# Patient Record
Sex: FEMALE | Race: WHITE | Employment: UNEMPLOYED | URBAN - METROPOLITAN AREA
[De-identification: names, ages, dates, MRNs, and addresses within clinical notes are randomized per-mention and may not be internally consistent; named-entity substitution may affect disease eponyms.]

---

## 2017-01-30 ENCOUNTER — OFFICE VISIT (OUTPATIENT)
Dept: URGENT CARE | Facility: CLINIC | Age: 32
End: 2017-01-30
Payer: COMMERCIAL

## 2017-01-30 VITALS
OXYGEN SATURATION: 98 % | TEMPERATURE: 97.2 F | DIASTOLIC BLOOD PRESSURE: 66 MMHG | HEIGHT: 68 IN | RESPIRATION RATE: 16 BRPM | WEIGHT: 180 LBS | HEART RATE: 60 BPM | BODY MASS INDEX: 27.28 KG/M2 | SYSTOLIC BLOOD PRESSURE: 110 MMHG

## 2017-01-30 DIAGNOSIS — N92.6 MISSED PERIOD: ICD-10-CM

## 2017-01-30 DIAGNOSIS — N92.0 MENORRHAGIA WITH REGULAR CYCLE: ICD-10-CM

## 2017-01-30 LAB
APPEARANCE UR: CLEAR
BILIRUB UR STRIP-MCNC: NORMAL MG/DL
COLOR UR AUTO: YELLOW
GLUCOSE UR STRIP.AUTO-MCNC: NORMAL MG/DL
INT CON NEG: NEGATIVE
INT CON POS: POSITIVE
KETONES UR STRIP.AUTO-MCNC: NORMAL MG/DL
LEUKOCYTE ESTERASE UR QL STRIP.AUTO: NORMAL
NITRITE UR QL STRIP.AUTO: NORMAL
PH UR STRIP.AUTO: 7 [PH] (ref 5–8)
POC URINE PREGNANCY TEST: NORMAL
PROT UR QL STRIP: NORMAL MG/DL
RBC UR QL AUTO: NORMAL
SP GR UR STRIP.AUTO: 1.03
UROBILINOGEN UR STRIP-MCNC: NORMAL MG/DL

## 2017-01-30 PROCEDURE — 81025 URINE PREGNANCY TEST: CPT | Performed by: PHYSICIAN ASSISTANT

## 2017-01-30 PROCEDURE — 99203 OFFICE O/P NEW LOW 30 MIN: CPT | Performed by: PHYSICIAN ASSISTANT

## 2017-01-30 PROCEDURE — 81002 URINALYSIS NONAUTO W/O SCOPE: CPT | Performed by: PHYSICIAN ASSISTANT

## 2017-01-30 ASSESSMENT — ENCOUNTER SYMPTOMS
EYES NEGATIVE: 1
COUGH: 1
NAUSEA: 1
NEUROLOGICAL NEGATIVE: 1
BACK PAIN: 0
PSYCHIATRIC NEGATIVE: 1
MUSCULOSKELETAL NEGATIVE: 1
CARDIOVASCULAR NEGATIVE: 1

## 2017-01-30 NOTE — PROGRESS NOTES
Subjective:      Lucia Espinosa is a 31 y.o. female who presents with Menorrhagia            HPI  Chief Complaint   Patient presents with   • Menorrhagia     two weeks late, has soaked two pads in about two hours, cramping,  sharp pains,        HPI:  Lucia Espinosa is a 31 y.o. L3 female who presents with missed period x 2 weeks.  She is very regular.  Currently started having severe cramping and menorrhagia since yesterday.  Soaked 2 pads in the last 2 hours.  Prior hx of .  Last birth May 8th via .  Had 3 periods since birth.  Breast feeding.    Normally has period 4-7, 7 days since birth of third child.  Usual periods are heavy for the first few days.  Normally able wear a pad 3-4 hours.      Not currently doing family planning.    Patient denies HA, SOB, chest pain, palpitations, fever, chills, dizziness, or n/v/d.      No past medical history on file.    No past surgical history on file.    No family history on file.    Social History     Social History   • Marital Status: Unknown     Spouse Name: N/A   • Number of Children: N/A   • Years of Education: N/A     Occupational History   • Not on file.     Social History Main Topics   • Smoking status: Not on file   • Smokeless tobacco: Not on file   • Alcohol Use: Not on file   • Drug Use: Not on file   • Sexual Activity: Not on file     Other Topics Concern   • Not on file     Social History Narrative   • No narrative on file       No current outpatient prescriptions on file.    Allergies   Allergen Reactions   • Amoxicillin      Hives     • Erythrocin      Hives and swelling           Review of Systems   Constitutional: Positive for malaise/fatigue.   HENT: Positive for congestion.    Eyes: Negative.    Respiratory: Positive for cough.    Cardiovascular: Negative.    Gastrointestinal: Positive for nausea.   Genitourinary: Negative for dysuria.        Heavy menses   Musculoskeletal: Negative.  Negative for back pain.  "  Skin: Negative.    Neurological: Negative.    Endo/Heme/Allergies: Negative.    Psychiatric/Behavioral: Negative.           Objective:     /66 mmHg  Pulse 60  Temp(Src) 36.2 °C (97.2 °F)  Resp 16  Ht 1.727 m (5' 8\")  Wt 81.647 kg (180 lb)  BMI 27.38 kg/m2  SpO2 98%  Breastfeeding? Yes     Physical Exam       Nursing note and vitals reviewed.    Constitutional:  Appropriately groomed, pleasant affect, and in no acute distress.    Head: normocephalic and atraumatic.    Eye:   PERRLA, EOM's full, sclera white, no scleral icterus, conjunctiva not erythematous, and medial canthus without exudate bilaterally.    Ears:  Hearing grossly intact to voice.    Throat:  Oropharynx mildly erythematous, with no enlargement of the palatine tonsils bilaterally with no exudates.    Posterior oropharynx with no post nasal drainage present.  Soft palate rises symmetrically bilaterally and uvula midline.  Neck supple, with mild proximal anterior cervical chain lymphadenopathy that is soft and mobile to palpation.  Thyroid non-palpable.  No supraclavicular lymphadenopathy.    Lungs:  Lungs with normal respiratory excursion and effort.    Heart:  RRR, without murmurs, rubs, or gallops.  Carotid arteries without bruits bilaterally.  Radial and dorsalis pedis pulses 2+ bilaterally    Abdomen:  Protuberant with  Striations.  No ecchymosis, or lesions.  C-sections scar present or abdomen, well-healed.  Bowel sounds present all 4Qs.  Normotympanic to percussion all 4Qs. Mild bilateral adnexa TTP.  No pain to palpation over the uterus or bladder.  No masses to palpation.  No hepatosplenomegaly, no TTP at McBurney's point, negative Davenport's sign, no rebound tenderness, and no guarding.  No CVA tenderness bilaterally.    MSK:  Gait and station wnl, non antalgic.    Derm:  No rashes or lesions with good turgor pressure.     Psychiatric:  Normal judgement, mood and affect.      Assessment/Plan:     1. Menorrhagia with regular " cycle  Patient presents with menorrhagia for 2 days. Patient is  L38 months postpartum via . Evidently, per patient she had questionable gestational diabetes versus prediabetes during last pregnancy. This is her third menstrual cycle post pregnancy, regular every 21 days. Last menstrual period .  She has not had irregularity in heaviness.  Started on yesterday with heavier flow today. Patient states she led to a full size maxi pad in 2 hours. No dizziness or history of anemia. On exam vital signs within normal limits. Abdominal exam with mild adnexal tenderness to palpation with no masses. Urine pregnancy negative.  Low suspicion for ectopic pregnancy. Suspect patient is having somewhat irregular menses due to postpartum status and 2 C-sections. No tenderness to palpation over the uterus or  scar. Advised patient to monitor for improvement. If not improving within 3 days and/or worsening symptoms such as dizziness, weakness, or severe pain recommended ER presentation. Patient concurrently with viral upper respiratory infection likely exacerbating her current symptoms.    Patient follow with PCP for further evaluation.    - POCT Urinalysis  - POCT PREGNANCY    2. Missed period    - POCT Urinalysis  - POCT PREGNANCY

## 2017-01-30 NOTE — PATIENT INSTRUCTIONS
Menorrhagia  Menorrhagia is the medical term for when your menstrual periods are heavy or last longer than usual. With menorrhagia, every period you have may cause enough blood loss and cramping that you are unable to maintain your usual activities.  CAUSES   In some cases, the cause of heavy periods is unknown, but a number of conditions may cause menorrhagia. Common causes include:  · A problem with the hormone-producing thyroid gland (hypothyroid).  · Noncancerous growths in the uterus (polyps or fibroids).  · An imbalance of the estrogen and progesterone hormones.  · One of your ovaries not releasing an egg during one or more months.  · Side effects of having an intrauterine device (IUD).  · Side effects of some medicines, such as anti-inflammatory medicines or blood thinners.  · A bleeding disorder that stops your blood from clotting normally.  SIGNS AND SYMPTOMS   During a normal period, bleeding lasts between 4 and 8 days. Signs that your periods are too heavy include:  · You routinely have to change your pad or tampon every 1 or 2 hours because it is completely soaked.  · You pass blood clots larger than 1 inch (2.5 cm) in size.  · You have bleeding for more than 7 days.  · You need to use pads and tampons at the same time because of heavy bleeding.  · You need to wake up to change your pads or tampons during the night.  · You have symptoms of anemia, such as tiredness, fatigue, or shortness of breath.   DIAGNOSIS   Your health care provider will perform a physical exam and ask you questions about your symptoms and menstrual history. Other tests may be ordered based on what the health care provider finds during the exam. These tests can include:  · Blood tests. Blood tests are used to check if you are pregnant or have hormonal changes, a bleeding or thyroid disorder, low iron levels (anemia), or other problems.  · Endometrial biopsy. Your health care provider takes a sample of tissue from the inside of your  uterus to be examined under a microscope.  · Pelvic ultrasound. This test uses sound waves to make a picture of your uterus, ovaries, and vagina. The pictures can show if you have fibroids or other growths.  · Hysteroscopy. For this test, your health care provider will use a small telescope to look inside your uterus.  Based on the results of your initial tests, your health care provider may recommend further testing.  TREATMENT   Treatment may not be needed. If it is needed, your health care provider may recommend treatment with one or more medicines first. If these do not reduce bleeding enough, a surgical treatment might be an option. The best treatment for you will depend on:   · Whether you need to prevent pregnancy.    · Your desire to have children in the future.  · The cause and severity of your bleeding.  · Your opinion and personal preference.    Medicines for menorrhagia may include:  · Birth control methods that use hormones. These include the pill, skin patch, vaginal ring, shots that you get every 3 months, hormonal IUD, and implant. These treatments reduce bleeding during your menstrual period.  · Medicines that thicken blood and slow bleeding.  · Medicines that reduce swelling, such as ibuprofen.   · Medicines that contain a synthetic hormone called progestin.    · Medicines that make the ovaries stop working for a short time.    You may need surgical treatment for menorrhagia if the medicines are unsuccessful. Treatment options include:  · Dilation and curettage (D&C). In this procedure, your health care provider opens (dilates) your cervix and then scrapes or suctions tissue from the lining of your uterus to reduce menstrual bleeding.  · Operative hysteroscopy. This procedure uses a tiny tube with a light (hysteroscope) to view your uterine cavity and can help in the surgical removal of a polyp that may be causing heavy periods.  · Endometrial ablation. Through various techniques, your health care  provider permanently destroys the entire lining of your uterus (endometrium). After endometrial ablation, most women have little or no menstrual flow. Endometrial ablation reduces your ability to become pregnant.  · Endometrial resection. This surgical procedure uses an electrosurgical wire loop to remove the lining of the uterus. This procedure also reduces your ability to become pregnant.  · Hysterectomy. Surgical removal of the uterus and cervix is a permanent procedure that stops menstrual periods. Pregnancy is not possible after a hysterectomy. This procedure requires anesthesia and hospitalization.  HOME CARE INSTRUCTIONS   · Only take over-the-counter or prescription medicines as directed by your health care provider. Take prescribed medicines exactly as directed. Do not change or switch medicines without consulting your health care provider.  · Take any prescribed iron pills exactly as directed by your health care provider. Long-term heavy bleeding may result in low iron levels. Iron pills help replace the iron your body lost from heavy bleeding. Iron may cause constipation. If this becomes a problem, increase the bran, fruits, and roughage in your diet.  · Do not take aspirin or medicines that contain aspirin 1 week before or during your menstrual period. Aspirin may make the bleeding worse.  · If you need to change your sanitary pad or tampon more than once every 2 hours, stay in bed and rest as much as possible until the bleeding stops.  · Eat well-balanced meals. Eat foods high in iron. Examples are leafy green vegetables, meat, liver, eggs, and whole grain breads and cereals. Do not try to lose weight until the abnormal bleeding has stopped and your blood iron level is back to normal.  SEEK MEDICAL CARE IF:   · You soak through a pad or tampon every 1 or 2 hours, and this happens every time you have a period.  · You need to use pads and tampons at the same time because you are bleeding so much.  · You  need to change your pad or tampon during the night.  · You have a period that lasts for more than 8 days.  · You pass clots bigger than 1 inch wide.  · You have irregular periods that happen more or less often than once a month.  · You feel dizzy or faint.  · You feel very weak or tired.  · You feel short of breath or feel your heart is beating too fast when you exercise.  · You have nausea and vomiting or diarrhea while you are taking your medicine.  · You have any problems that may be related to the medicine you are taking.  SEEK IMMEDIATE MEDICAL CARE IF:   · You soak through 4 or more pads or tampons in 2 hours.  · You have any bleeding while you are pregnant.  MAKE SURE YOU:   · Understand these instructions.  · Will watch your condition.  · Will get help right away if you are not doing well or get worse.     This information is not intended to replace advice given to you by your health care provider. Make sure you discuss any questions you have with your health care provider.     Document Released: 12/18/2006 Document Revised: 12/23/2014 Document Reviewed: 06/08/2014  [employer] Patient Education ©2016 [employer]

## 2017-01-30 NOTE — MR AVS SNAPSHOT
"        Lucia Davisdon-Kanwal   2017 12:45 PM   Office Visit   MRN: [id number]    Department:  [facility]   Dept Phone:  [phone]    Description:  Female : 1985   Provider:  Kian Chiu PA-C           Reason for Visit     Menorrhagia two weeks late, has soaked two pads in about two hours, cramping,  sharp pains,       Allergies as of 2017     Allergen Noted Reactions    Amoxicillin 2017       Hives      Erythrocin 2017       Hives and swelling       You were diagnosed with     Menorrhagia with regular cycle   [351931]       Missed period   [424269]         Vital Signs     Blood Pressure Pulse Temperature Respirations Height Weight    110/66 mmHg 60 36.2 °C (97.2 °F) 16 1.727 m (5' 8\") 81.647 kg (180 lb)    Body Mass Index Oxygen Saturation Breastfeeding?             27.38 kg/m2 98% Yes         Basic Information     Date Of Birth Sex Race Ethnicity Preferred Language    1985 Female White Unknown English      Health Maintenance     Patient has no pending health maintenance at this time      Results     POCT Urinalysis      Component Value Standard Range & Units    POC Color YELLOW Negative    POC Appearance CLEAR Negative    POC Leukocyte Esterase NEG Negative    POC Nitrites NEG Negative    POC Urobiligen NEG Negative (0.2) mg/dL    POC Protein TRACE Negative mg/dL    POC Urine PH 7.0 5.0 - 8.0    POC Blood LARGE Negative    POC Specific Gravity 1.030 <1.005 - >1.030    POC Ketones NEG Negative mg/dL    POC Biliruben NEG Negative mg/dL    POC Glucose NEG Negative mg/dL                POCT PREGNANCY      Component Value Standard Range & Units    POC Urine Pregnancy Test NEG Negative    Internal Control Positive Positive     Internal Control Negative Negative                         Current Immunizations     No immunizations on file.      Below and/or attached are the medications your provider expects you to take. Review all of your home medications and newly " ordered medications with your provider and/or pharmacist. Follow medication instructions as directed by your provider and/or pharmacist. Please keep your medication list with you and share with your provider. Update the information when medications are discontinued, doses are changed, or new medications (including over-the-counter products) are added; and carry medication information at all times in the event of emergency situations     Allergies:  AMOXICILLIN - (reactions not documented)     ERYTHROCIN - (reactions not documented)               Medications  Valid as of: January 30, 2017 -  2:23 PM    Generic Name Brand Name Tablet Size Instructions for use    .                 Medicines prescribed today were sent to:     [facility] - [place], [place] - [address]    [address] [facility] [place] [address]    Phone: [phone] Fax: [phone]    Open 24 Hours?: No      Medication refill instructions:       If your prescription bottle indicates you have medication refills left, it is not necessary to call your provider’s office. Please contact your pharmacy and they will refill your medication.    If your prescription bottle indicates you do not have any refills left, you may request refills at any time through one of the following ways: The online [employer] system (except Urgent Care), by calling your provider’s office, or by asking your pharmacy to contact your provider’s office with a refill request. Medication refills are processed only during regular business hours and may not be available until the next business day. Your provider may request additional information or to have a follow-up visit with you prior to refilling your medication.   *Please Note: Medication refills are assigned a new Rx number when refilled electronically. Your pharmacy may indicate that no refills were authorized even though a new prescription for the same medication is available at the pharmacy. Please  request the medicine by name with the pharmacy before contacting your provider for a refill.        Instructions    Menorrhagia  Menorrhagia is the medical term for when your menstrual periods are heavy or last longer than usual. With menorrhagia, every period you have may cause enough blood loss and cramping that you are unable to maintain your usual activities.  CAUSES   In some cases, the cause of heavy periods is unknown, but a number of conditions may cause menorrhagia. Common causes include:  · A problem with the hormone-producing thyroid gland (hypothyroid).  · Noncancerous growths in the uterus (polyps or fibroids).  · An imbalance of the estrogen and progesterone hormones.  · One of your ovaries not releasing an egg during one or more months.  · Side effects of having an intrauterine device (IUD).  · Side effects of some medicines, such as anti-inflammatory medicines or blood thinners.  · A bleeding disorder that stops your blood from clotting normally.  SIGNS AND SYMPTOMS   During a normal period, bleeding lasts between 4 and 8 days. Signs that your periods are too heavy include:  · You routinely have to change your pad or tampon every 1 or 2 hours because it is completely soaked.  · You pass blood clots larger than 1 inch (2.5 cm) in size.  · You have bleeding for more than 7 days.  · You need to use pads and tampons at the same time because of heavy bleeding.  · You need to wake up to change your pads or tampons during the night.  · You have symptoms of anemia, such as tiredness, fatigue, or shortness of breath.   DIAGNOSIS   Your health care provider will perform a physical exam and ask you questions about your symptoms and menstrual history. Other tests may be ordered based on what the health care provider finds during the exam. These tests can include:  · Blood tests. Blood tests are used to check if you are pregnant or have hormonal changes, a bleeding or thyroid disorder, low iron levels (anemia),  or other problems.  · Endometrial biopsy. Your health care provider takes a sample of tissue from the inside of your uterus to be examined under a microscope.  · Pelvic ultrasound. This test uses sound waves to make a picture of your uterus, ovaries, and vagina. The pictures can show if you have fibroids or other growths.  · Hysteroscopy. For this test, your health care provider will use a small telescope to look inside your uterus.  Based on the results of your initial tests, your health care provider may recommend further testing.  TREATMENT   Treatment may not be needed. If it is needed, your health care provider may recommend treatment with one or more medicines first. If these do not reduce bleeding enough, a surgical treatment might be an option. The best treatment for you will depend on:   · Whether you need to prevent pregnancy.    · Your desire to have children in the future.  · The cause and severity of your bleeding.  · Your opinion and personal preference.    Medicines for menorrhagia may include:  · Birth control methods that use hormones. These include the pill, skin patch, vaginal ring, shots that you get every 3 months, hormonal IUD, and implant. These treatments reduce bleeding during your menstrual period.  · Medicines that thicken blood and slow bleeding.  · Medicines that reduce swelling, such as ibuprofen.   · Medicines that contain a synthetic hormone called progestin.    · Medicines that make the ovaries stop working for a short time.    You may need surgical treatment for menorrhagia if the medicines are unsuccessful. Treatment options include:  · Dilation and curettage (D&C). In this procedure, your health care provider opens (dilates) your cervix and then scrapes or suctions tissue from the lining of your uterus to reduce menstrual bleeding.  · Operative hysteroscopy. This procedure uses a tiny tube with a light (hysteroscope) to view your uterine cavity and can help in the surgical removal  of a polyp that may be causing heavy periods.  · Endometrial ablation. Through various techniques, your health care provider permanently destroys the entire lining of your uterus (endometrium). After endometrial ablation, most women have little or no menstrual flow. Endometrial ablation reduces your ability to become pregnant.  · Endometrial resection. This surgical procedure uses an electrosurgical wire loop to remove the lining of the uterus. This procedure also reduces your ability to become pregnant.  · Hysterectomy. Surgical removal of the uterus and cervix is a permanent procedure that stops menstrual periods. Pregnancy is not possible after a hysterectomy. This procedure requires anesthesia and hospitalization.  HOME CARE INSTRUCTIONS   · Only take over-the-counter or prescription medicines as directed by your health care provider. Take prescribed medicines exactly as directed. Do not change or switch medicines without consulting your health care provider.  · Take any prescribed iron pills exactly as directed by your health care provider. Long-term heavy bleeding may result in low iron levels. Iron pills help replace the iron your body lost from heavy bleeding. Iron may cause constipation. If this becomes a problem, increase the bran, fruits, and roughage in your diet.  · Do not take aspirin or medicines that contain aspirin 1 week before or during your menstrual period. Aspirin may make the bleeding worse.  · If you need to change your sanitary pad or tampon more than once every 2 hours, stay in bed and rest as much as possible until the bleeding stops.  · Eat well-balanced meals. Eat foods high in iron. Examples are leafy green vegetables, meat, liver, eggs, and whole grain breads and cereals. Do not try to lose weight until the abnormal bleeding has stopped and your blood iron level is back to normal.  SEEK MEDICAL CARE IF:   · You soak through a pad or tampon every 1 or 2 hours, and this happens every  time you have a period.  · You need to use pads and tampons at the same time because you are bleeding so much.  · You need to change your pad or tampon during the night.  · You have a period that lasts for more than 8 days.  · You pass clots bigger than 1 inch wide.  · You have irregular periods that happen more or less often than once a month.  · You feel dizzy or faint.  · You feel very weak or tired.  · You feel short of breath or feel your heart is beating too fast when you exercise.  · You have nausea and vomiting or diarrhea while you are taking your medicine.  · You have any problems that may be related to the medicine you are taking.  SEEK IMMEDIATE MEDICAL CARE IF:   · You soak through 4 or more pads or tampons in 2 hours.  · You have any bleeding while you are pregnant.  MAKE SURE YOU:   · Understand these instructions.  · Will watch your condition.  · Will get help right away if you are not doing well or get worse.     This information is not intended to replace advice given to you by your health care provider. Make sure you discuss any questions you have with your health care provider.     Document Released: 12/18/2006 Document Revised: 12/23/2014 Document Reviewed: 06/08/2014  [employer] Interactive Patient Education ©2016 [employer] Inc.            LOOKK Access Code: [email or web address]  Expires: 3/1/2017  2:23 PM    Your email address is not on file at [facility].  Email Addresses are required for you to sign up for LOOKK, please contact [phone] to verify your personal information and to provide your email address prior to attempting to register for LOOKK.    Lucia Espinosa  [address] [place] [address]    LOOKK  A secure, online tool to manage your health information     [facility]’s LOOKK® is a secure, online tool that connects you to your personalized health information from the privacy of your home -- day or night - making it very easy for you to manage your  healthcare. Once the activation process is completed, you can even access your medical information using the CityPockets sanjuana, which is available for free in the Apple Sanjuana store or Google Play store.     To learn more about CityPockets, visit [email or web address]    There are two levels of access available (as shown below):   My Chart Features  [facility]  Urgent  Care [facility] Doctor   Email your healthcare team securely and privately 24/7 X X X    Manage appointments: schedule your next appointment; view details of past/upcoming appointments X      Request prescription refills. X      View recent personal medical records, including lab and immunizations X X X X   View health record, including health history, allergies, medications X X X X   Read reports about your outpatient visits, procedures, consult and ER notes X X X X   See your discharge summary, which is a recap of your hospital and/or ER visit that includes your diagnosis, lab results, and care plan X X  X     How to register for CityPockets:  Once your e-mail address has been verified, follow the following steps to sign up for CityPockets.     1. Go to  [email or web address]  2. Click on the Sign Up Now box, which takes you to the New Member Sign Up page. You will need to provide the following information:  a. Enter your CityPockets Access Code exactly as it appears at the top of this page. (You will not need to use this code after you’ve completed the sign-up process. If you do not sign up before the expiration date, you must request a new code.)   b. Enter your date of birth.   c. Enter your home email address.   d. Click Submit, and follow the next screen’s instructions.  3. Create a Anthillzt ID. This will be your CityPockets login ID and cannot be changed, so think of one that is secure and easy to remember.  4. Create a CityPockets password. You can change your password at any time.  5. Enter your Password Reset  Question and Answer. This can be used at a later time if you forget your password.   6. Enter your e-mail address. This allows you to receive e-mail notifications when new information is available in [employer].  7. Click Sign Up. You can now view your health information.    For assistance activating your [employer] account, call [phone]

## 2019-01-25 ENCOUNTER — OFFICE VISIT (OUTPATIENT)
Dept: URGENT CARE | Facility: CLINIC | Age: 34
End: 2019-01-25
Payer: COMMERCIAL

## 2019-01-25 VITALS
HEIGHT: 68 IN | RESPIRATION RATE: 16 BRPM | DIASTOLIC BLOOD PRESSURE: 70 MMHG | BODY MASS INDEX: 27.89 KG/M2 | OXYGEN SATURATION: 99 % | HEART RATE: 82 BPM | SYSTOLIC BLOOD PRESSURE: 122 MMHG | TEMPERATURE: 98.3 F | WEIGHT: 184 LBS

## 2019-01-25 DIAGNOSIS — R20.3 SENSITIVE SKIN: ICD-10-CM

## 2019-01-25 DIAGNOSIS — R21 RASH: ICD-10-CM

## 2019-01-25 PROCEDURE — 99214 OFFICE O/P EST MOD 30 MIN: CPT | Mod: 25 | Performed by: PHYSICIAN ASSISTANT

## 2019-01-25 RX ORDER — METHYLPREDNISOLONE 4 MG/1
TABLET ORAL
Qty: 1 KIT | Refills: 0 | Status: SHIPPED | OUTPATIENT
Start: 2019-01-25 | End: 2024-02-27

## 2019-01-25 RX ORDER — METHYLPREDNISOLONE SODIUM SUCCINATE 125 MG/2ML
125 INJECTION, POWDER, LYOPHILIZED, FOR SOLUTION INTRAMUSCULAR; INTRAVENOUS ONCE
Status: COMPLETED | OUTPATIENT
Start: 2019-01-25 | End: 2019-01-25

## 2019-01-25 RX ORDER — HYDROXYZINE PAMOATE 25 MG/1
25 CAPSULE ORAL 3 TIMES DAILY PRN
Qty: 30 CAP | Refills: 0 | Status: SHIPPED | OUTPATIENT
Start: 2019-01-25 | End: 2024-02-27

## 2019-01-25 RX ADMIN — METHYLPREDNISOLONE SODIUM SUCCINATE 125 MG: 125 INJECTION, POWDER, LYOPHILIZED, FOR SOLUTION INTRAMUSCULAR; INTRAVENOUS at 16:37

## 2019-01-30 ASSESSMENT — ENCOUNTER SYMPTOMS
FEVER: 0
FALLS: 0
SHORTNESS OF BREATH: 0
COUGH: 0
RHINORRHEA: 0
EYE DISCHARGE: 0
EYE REDNESS: 0
VOMITING: 0
DIARRHEA: 0
CHILLS: 0

## 2019-01-30 NOTE — PROGRESS NOTES
"Subjective:      Lucia Espinosa is a 33 y.o. female who presents with Rash (rash on scalp, arms, legs, face, back x 3 days - possibly started from a shampoo)            Pt is a 34 y/o female who presents to  with scattered rash to her head and neck that developed 3-4 days ago. She reports that she believes this is due to a new shampoo that she used. She has had hx of reactions similar to this in the past, however the rash has gotten worse and has spread to her arms, and back.   She denies any new soaps, detergents, frequency solutions.  She does report prior history of very sensitive skin of which she will \"breakout like this \".  Patient has been utilizing hydrocortisone cream with minimal relief of symptoms.      Rash   This is a new problem. Episode onset: 3-4 days ago. The problem has been gradually worsening since onset. Location: As above. The rash is characterized by itchiness. Associated with: New shampoo. Pertinent negatives include no congestion, cough, diarrhea, fever, joint pain, rhinorrhea, shortness of breath or vomiting. Treatments tried: As above. Her past medical history is significant for allergies.       Review of Systems   Constitutional: Negative for chills, fever and malaise/fatigue.   HENT: Negative for congestion and rhinorrhea.    Eyes: Negative for discharge and redness.   Respiratory: Negative for cough and shortness of breath.    Gastrointestinal: Negative for diarrhea and vomiting.   Musculoskeletal: Negative for falls and joint pain.   Skin: Positive for itching and rash.          Objective:     /70 (BP Location: Right arm, Patient Position: Sitting, BP Cuff Size: Large adult)   Pulse 82   Temp 36.8 °C (98.3 °F) (Temporal)   Resp 16   Ht 1.727 m (5' 7.99\")   Wt 83.5 kg (184 lb)   SpO2 99%   BMI 27.98 kg/m²    PMH:  has no past medical history on file.  MEDS:   Current Outpatient Prescriptions:   •  MethylPREDNISolone (MEDROL DOSEPAK) 4 MG Tablet Therapy Pack, UAD, " Disp: 1 Kit, Rfl: 0  •  hydrOXYzine pamoate (VISTARIL) 25 MG Cap, Take 1 Cap by mouth 3 times a day as needed for Other (May cause sedation)., Disp: 30 Cap, Rfl: 0  ALLERGIES:   Allergies   Allergen Reactions   • Amoxicillin      Hives     • Erythrocin      Hives and swelling      SURGHX: History reviewed. No pertinent surgical history.  SOCHX:  reports that she has never smoked. She does not have any smokeless tobacco history on file.  FH: Family history was reviewed, no pertinent findings to report    Physical Exam   Constitutional: She is oriented to person, place, and time. She appears well-developed and well-nourished. No distress.   HENT:   Head: Normocephalic and atraumatic.   Eyes: Pupils are equal, round, and reactive to light. Conjunctivae and EOM are normal.   Neck: Normal range of motion. Neck supple. No tracheal deviation present.   Cardiovascular: Normal rate and regular rhythm.    No murmur heard.  Pulmonary/Chest: Effort normal and breath sounds normal. No respiratory distress.   Musculoskeletal: Normal range of motion. She exhibits no edema.   Neurological: She is alert and oriented to person, place, and time. Coordination normal.   Skin: Skin is warm. Rash noted.   Erythematous papular pruritic rash- scalp, neck, and upper chest, and BUE.      Psychiatric: She has a normal mood and affect. Her behavior is normal. Judgment and thought content normal.   Vitals reviewed.              Assessment/Plan:     1. Rash  - MethylPREDNISolone (MEDROL DOSEPAK) 4 MG Tablet Therapy Pack; UAD  Dispense: 1 Kit; Refill: 0  - hydrOXYzine pamoate (VISTARIL) 25 MG Cap; Take 1 Cap by mouth 3 times a day as needed for Other (May cause sedation).  Dispense: 30 Cap; Refill: 0  - methylPREDNISolone sod succ (SOLU-MEDROL) 125 MG injection 125 mg; 2 mL by Intramuscular route Once.    2. Sensitive skin    Suspect reaction to new shampoo- as pt. Has hx of same in the past- pt. Is to stop use and is to avoid any fragrances, hot  showers, and avoid scratching.   Pt. Has the rash so diffusely- pt. Would like to utilize the steroid injection in clinic today. Will also taper her on solumedrol, will also place her on vistaril to help with the itching.   Discussed s/s of secondary bacterial infection and what to return to clinic for.     Patient given precautionary s/sx that mandate immediate follow up and evaluation in the ED. Advised of risks of not doing so.    DDX, Supportive care, and indications for immediate follow-up discussed with patient.    Instructed to return to clinic or nearest emergency department if we are not available for any change in condition, further concerns, or worsening of symptoms.    The patient demonstrated a good understanding and agreed with the treatment plan.  Please note that this dictation was created using voice recognition software. I have made every reasonable attempt to correct obvious errors, but I expect that there are errors of grammar and possibly content that I did not discover before finalizing the note.

## 2022-05-27 DIAGNOSIS — R07.9 CHEST PAIN, UNSPECIFIED TYPE: Primary | ICD-10-CM

## 2022-05-28 LAB — EKG IMPRESSION: NORMAL

## 2023-01-13 ENCOUNTER — HOSPITAL ENCOUNTER (OUTPATIENT)
Dept: RADIOLOGY | Facility: MEDICAL CENTER | Age: 38
End: 2023-01-13
Payer: COMMERCIAL

## 2023-01-13 ENCOUNTER — OFFICE VISIT (OUTPATIENT)
Dept: URGENT CARE | Facility: PHYSICIAN GROUP | Age: 38
End: 2023-01-13
Payer: COMMERCIAL

## 2023-01-13 VITALS
BODY MASS INDEX: 30.31 KG/M2 | RESPIRATION RATE: 16 BRPM | WEIGHT: 200 LBS | DIASTOLIC BLOOD PRESSURE: 64 MMHG | HEIGHT: 68 IN | SYSTOLIC BLOOD PRESSURE: 108 MMHG | HEART RATE: 89 BPM | OXYGEN SATURATION: 100 % | TEMPERATURE: 97 F

## 2023-01-13 DIAGNOSIS — S99.921A INJURY OF RIGHT FOOT, INITIAL ENCOUNTER: ICD-10-CM

## 2023-01-13 DIAGNOSIS — S90.31XA CONTUSION OF RIGHT FOOT, INITIAL ENCOUNTER: ICD-10-CM

## 2023-01-13 PROCEDURE — 99213 OFFICE O/P EST LOW 20 MIN: CPT

## 2023-01-13 PROCEDURE — 73630 X-RAY EXAM OF FOOT: CPT | Mod: RT

## 2023-01-13 RX ORDER — IBUPROFEN 600 MG/1
600 TABLET ORAL EVERY 6 HOURS PRN
Qty: 30 TABLET | Refills: 0 | Status: SHIPPED | OUTPATIENT
Start: 2023-01-13 | End: 2024-02-27

## 2023-01-13 RX ORDER — CYCLOBENZAPRINE HCL 5 MG
5-10 TABLET ORAL 2 TIMES DAILY PRN
Qty: 28 TABLET | Refills: 0 | Status: SHIPPED | OUTPATIENT
Start: 2023-01-13 | End: 2023-01-27

## 2023-01-13 NOTE — PROGRESS NOTES
"Subjective:   Lucia Espinosa is a 37 y.o. female who presents for Foot Injury (Last Sunday, Cow stepped on R foot, pain slowly gotten worse, numb in toes, pain limited ROM)      HPI:    Patient presents to urgent care with signs of fracture in her right foot.  Patient states that she was loading a cow into a trailer on Sunday 01/08/23 when the cow stepped backwards and onto her right foot.  She reports she was wearing boots.  Patient states her pain is a 7 out of 10, she has been unable to bear weight onto the right foot.  She states her pain has been worsening throughout the week and she is experiencing new onset of numbness and tingling in her toes.  She reports pain radiates up through her right ankle.  She reports decreased ability to wiggle her toes. Very mild improvement of pain with use of Tylenol and ibuprofen.  She has been resting extremity, and elevating it.      ROS As above in HPI    Medications:    Current Outpatient Medications on File Prior to Visit   Medication Sig Dispense Refill    MethylPREDNISolone (MEDROL DOSEPAK) 4 MG Tablet Therapy Pack UAD 1 Kit 0    hydrOXYzine pamoate (VISTARIL) 25 MG Cap Take 1 Cap by mouth 3 times a day as needed for Other (May cause sedation). 30 Cap 0     No current facility-administered medications on file prior to visit.        Allergies:   Amoxicillin, Erythrocin, Latex, and Tree nuts food allergy    Problem List:   There is no problem list on file for this patient.       Surgical History:  No past surgical history on file.    Past Social Hx:   Social History     Tobacco Use    Smoking status: Never    Smokeless tobacco: Never          Problem list, medications, and allergies reviewed by myself today in Epic.     Objective:     /64   Pulse 89   Temp 36.1 °C (97 °F) (Temporal)   Resp 16   Ht 1.727 m (5' 8\")   Wt 90.7 kg (200 lb)   SpO2 100%   BMI 30.41 kg/m²     Physical Exam  Vitals and nursing note reviewed.   Constitutional:       General: She " is not in acute distress.     Appearance: Normal appearance. She is not ill-appearing or diaphoretic.   HENT:      Head: Normocephalic and atraumatic.   Eyes:      Conjunctiva/sclera: Conjunctivae normal.   Cardiovascular:      Rate and Rhythm: Normal rate and regular rhythm.      Heart sounds: Normal heart sounds.   Pulmonary:      Effort: Pulmonary effort is normal.      Breath sounds: Normal breath sounds.   Abdominal:      General: Bowel sounds are normal.      Palpations: Abdomen is soft.   Musculoskeletal:      Comments: Dorsal aspect of right foot is tender to palpation.  No deformity, dislocation, bony step-offs appreciated.  No tenderness to right ankle, right lower leg, right knee.  Decreased range of motion secondary to pain.  Strength is measured to the unaffected extremity.  Sensation is intact to light and sharp touch, cap refill less than 2 seconds, 1+ DP pulse.  There is 2+ nonpitting edema, ecchymosis, warmth.  No laceration, rash.  Patient is unable to walk unassisted.    Skin:     General: Skin is warm and dry.      Capillary Refill: Capillary refill takes less than 2 seconds.      Findings: No rash.   Neurological:      Mental Status: She is alert and oriented to person, place, and time.     DX-FOOT-COMPLETE 3+ RIGHT 01/13/2023    Narrative  1/13/2023 12:38 PM    HISTORY/REASON FOR EXAM:  Pain/Deformity Following Trauma; Cow stepped on foot on Sunday. +swelling, TTP, numbness/tingling of 1st - 3rd toes  Right foot pain, crush injury    TECHNIQUE/EXAM DESCRIPTION AND NUMBER OF VIEWS:  3 views of the RIGHT foot.    COMPARISON:  None.    FINDINGS:    There is no fracture.    Alignment is normal.    No degenerative changes are present.    Impression  Negative right foot series        Assessment/Plan:     Diagnosis and associated orders:   1. Injury of right foot, initial encounter  - DX-FOOT-COMPLETE 3+ RIGHT; Future  - CRUTCHES  - POCT Pregnancy    2. Contusion of right foot, initial encounter  -  Walking Boot  - Ace Wrap  - ibuprofen (MOTRIN) 600 MG Tab; Take 1 Tablet by mouth every 6 hours as needed for Moderate Pain.  Dispense: 30 Tablet; Refill: 0  - cyclobenzaprine (FLEXERIL) 5 mg tablet; Take 1-2 Tablets by mouth 2 times a day as needed for Muscle Spasms or Moderate Pain for up to 14 days.  Dispense: 28 Tablet; Refill: 0        Comments/MDM:     Right foot xray is negative for acute fracture and dislocation, per radiology impression.  Continue with resting, elevation, compression wrap, and application of ice packs.  Tylenol/Ibu per package instructions  No driving or alcohol use while using Flexeril  Patient fitted with boot, to be worn while weight bearing.   Return to  for evaluation in 14 days if no improvement.  Follow up with PCP       Pt is clinically stable at today's acute urgent care visit.  No acute distress noted. Appropriate for outpatient management at this time.       Discussed DDx, management options (risks,benefits, and alternatives to planned treatment), natural progression and supportive care.  Expressed understanding and the treatment plan was agreed upon. Questions were encouraged and answered   Return to urgent care prn if new or worsening sx or if there is no improvement in condition prn.    Educated in Red flags and indications to immediately call 911 or present to the Emergency Department.   Advised the patient to follow-up with the primary care physician for recheck, reevaluation, and consideration of further management.      Please note that this dictation was created using voice recognition software. I have made a reasonable attempt to correct obvious errors, but I expect that there are errors of grammar and possibly content that I did not discover before finalizing the note.    This note was electronically signed by Mariana Heath, MAXWELL

## 2024-03-12 PROBLEM — N39.3 STRESS INCONTINENCE (FEMALE) (MALE): Status: ACTIVE | Noted: 2024-03-12

## 2025-07-13 ENCOUNTER — OFFICE VISIT (OUTPATIENT)
Dept: URGENT CARE | Facility: PHYSICIAN GROUP | Age: 40
End: 2025-07-13
Payer: COMMERCIAL

## 2025-07-13 VITALS
BODY MASS INDEX: 31.86 KG/M2 | DIASTOLIC BLOOD PRESSURE: 78 MMHG | RESPIRATION RATE: 16 BRPM | OXYGEN SATURATION: 97 % | SYSTOLIC BLOOD PRESSURE: 126 MMHG | HEART RATE: 76 BPM | TEMPERATURE: 98.3 F | HEIGHT: 68 IN | WEIGHT: 210.2 LBS

## 2025-07-13 DIAGNOSIS — R42 CHRONIC VERTIGO: ICD-10-CM

## 2025-07-13 DIAGNOSIS — H92.03 EAR PAIN, BILATERAL: Primary | ICD-10-CM

## 2025-07-13 DIAGNOSIS — G43.909 MIGRAINE WITHOUT STATUS MIGRAINOSUS, NOT INTRACTABLE, UNSPECIFIED MIGRAINE TYPE: ICD-10-CM

## 2025-07-13 DIAGNOSIS — G43.709 CHRONIC MIGRAINE WITHOUT AURA WITHOUT STATUS MIGRAINOSUS, NOT INTRACTABLE: ICD-10-CM

## 2025-07-13 PROCEDURE — 96372 THER/PROPH/DIAG INJ SC/IM: CPT | Performed by: FAMILY MEDICINE

## 2025-07-13 PROCEDURE — 99214 OFFICE O/P EST MOD 30 MIN: CPT | Mod: 25 | Performed by: FAMILY MEDICINE

## 2025-07-13 RX ORDER — LORATADINE 10 MG/1
10 TABLET ORAL DAILY
COMMUNITY

## 2025-07-13 RX ORDER — KETOROLAC TROMETHAMINE 15 MG/ML
15 INJECTION, SOLUTION INTRAMUSCULAR; INTRAVENOUS ONCE
Status: COMPLETED | OUTPATIENT
Start: 2025-07-13 | End: 2025-07-13

## 2025-07-13 RX ORDER — PREDNISONE 20 MG/1
TABLET ORAL
Qty: 10 TABLET | Refills: 0 | Status: SHIPPED | OUTPATIENT
Start: 2025-07-13

## 2025-07-13 RX ORDER — MECLIZINE HYDROCHLORIDE 25 MG/1
TABLET ORAL
Qty: 30 TABLET | Refills: 2 | Status: SHIPPED | OUTPATIENT
Start: 2025-07-13

## 2025-07-13 RX ADMIN — KETOROLAC TROMETHAMINE 15 MG: 15 INJECTION, SOLUTION INTRAMUSCULAR; INTRAVENOUS at 11:39

## 2025-07-13 RX ADMIN — KETOROLAC TROMETHAMINE 15 MG: 15 INJECTION, SOLUTION INTRAMUSCULAR; INTRAVENOUS at 11:38

## 2025-07-13 NOTE — PROGRESS NOTES
Chief Complaint:    Chief Complaint   Patient presents with    Otalgia     Pt states ear pain in both ears. She's been also experiencing vertigo, migraine, and sharp stabbing pain in her ears.  Vertigo off and on for three years, ear pain started yetserday       History of Present Illness:    Bilateral ear pain - right ear started yesterday - was sharp pains. Not as bad today. Left ear ache started today. She reports she has not had PE tubes in the past, but has had bilsters on ear drum and they have ruptured in the past.    She currently has a migraine that started yesterday, severity is worse than usual. Has associated light sensitivity. No change in vision, nausea, or vomiting. She reports migraines have been worse over the past month and happen very often, likely more than 15 episodes per month. She usually tries to sleep off the migraine or give it time to self-resolve, sometimes takes OTC med for the migraine. She has not seen a Neurologist for migraines in the past.    She also reports vertigo x 3 years. She has not seen a Neurologist for this. She was prescribed a med for this in the past by her PCP which she felt controlled the symptoms some. The vertigo is often recently.      Past Medical History:    Past Medical History[1]    Past Surgical History:    Past Surgical History[2]    Social History:    Social History     Socioeconomic History    Marital status: Unknown     Spouse name: Not on file    Number of children: Not on file    Years of education: Not on file    Highest education level: Not on file   Occupational History    Not on file   Tobacco Use    Smoking status: Never    Smokeless tobacco: Never   Vaping Use    Vaping status: Never Used   Substance and Sexual Activity    Alcohol use: Yes     Comment: occasional    Drug use: Never    Sexual activity: Not on file   Other Topics Concern    Not on file   Social History Narrative    Not on file     Social Drivers of Health     Financial Resource  "Strain: Not on file   Food Insecurity: Not on file   Transportation Needs: Not on file   Physical Activity: Not on file   Stress: Not on file   Social Connections: Not on file   Intimate Partner Violence: Not on file   Housing Stability: Not on file     Family History:    History reviewed. No pertinent family history.    Medications:    Medications Ordered Prior to Encounter[3]    Allergies:    Allergies[4]      Vitals:    Vitals:    25 1112   BP: 126/78   Pulse: 76   Resp: 16   Temp: 36.8 °C (98.3 °F)   TempSrc: Temporal   SpO2: 97%   Weight: 95.3 kg (210 lb 3.2 oz)   Height: 1.727 m (5' 8\")       Physical Exam:    Constitutional: Vital signs reviewed. Appears well-developed and well-nourished. No acute distress. Prefers wearing sunglasses due to light sensitivity.  Eyes: Sclera white, conjunctivae clear. PERRLA.  ENT: External ears normal. External auditory canals normal without discharge. TMs translucent and non-bulging with scarring on right TM. Hearing normal. Lips are normal.   Neck: Neck supple.   Cardiovascular: Regular rate and rhythm. No murmur.  Pulmonary/Chest: Respirations non-labored. Clear to auscultation bilaterally.  Musculoskeletal: Normal gait. No muscular atrophy or weakness.  Neurological: Alert and oriented to person, place, and time. CN 2-12 intact. Muscle tone normal. Coordination normal. Normal cerebellar exam.  Skin: No rashes or lesions. Warm, dry, normal turgor.  Psychiatric: Normal mood and affect. Behavior is normal. Judgment and thought content normal.       Assessment / Plan & Medical Decision Makin. Ear pain, bilateral (Primary)  - ketorolac (Toradol) 15 MG/ML injection 15 mg  - ketorolac (Toradol) 15 MG/ML injection 15 mg  - predniSONE (DELTASONE) 20 MG Tab; 1 tab by mouth once a day only if needed for headache and/or ear pain. Take with food.  Dispense: 10 Tablet; Refill: 0    2. Chronic vertigo  - meclizine (ANTIVERT) 25 MG Tab; 1 tab by mouth every 6 hours only if " needed for dizziness, nausea, or vomiting. May cause drowsiness.  Dispense: 30 Tablet; Refill: 2  - Referral to Neurology    3. Chronic migraine without aura without status migrainosus, not intractable  - Referral to Neurology    4. Migraine without status migrainosus, not intractable, unspecified migraine type  - ketorolac (Toradol) 15 MG/ML injection 15 mg  - ketorolac (Toradol) 15 MG/ML injection 15 mg  - predniSONE (DELTASONE) 20 MG Tab; 1 tab by mouth once a day only if needed for headache and/or ear pain. Take with food.  Dispense: 10 Tablet; Refill: 0       Discussed with her DDX, management options, and risks, benefits, and alternatives to treatment plan agreed upon.    Bilateral ear pain - right ear started yesterday - was sharp pains. Not as bad today. Left ear ache started today. She reports she has not had PE tubes in the past, but has had bilsters on ear drum and they have ruptured in the past.    She currently has a migraine that started yesterday, severity is worse than usual. Has associated light sensitivity. No change in vision, nausea, or vomiting. She reports migraines have been worse over the past month and happen very often, likely more than 15 episodes per month. She usually tries to sleep off the migraine or give it time to self-resolve, sometimes takes OTC med for the migraine. She has not seen a Neurologist for migraines in the past.    She also reports vertigo x 3 years. She has not seen a Neurologist for this. She was prescribed a med for this in the past by her PCP which she felt controlled the symptoms some. The vertigo is often recently.    Prefers wearing sunglasses due to light sensitivity.    ? etiology of ear pain. I do not see evidence of ear infection today. Possible eustachian tube dysfunction of other. Toradol IM and/or Prednisone may help this.    Migraine - chronic condition with acute exacerbation.     Vertigo - chronic condition, uncontrolled.    Agreeable to medications  given and prescribed.    Due to chronicity of migraines and vertigo, Neurology referral ordered.    Discussed expected course of duration, time for improvement, and to seek follow-up in Emergency Room, urgent care, or with PCP if getting worse at any time or not improving within expected time frame.        [1]   Past Medical History:  Diagnosis Date    Asthma     Diabetes (HCC)     gestational    Eczema     Infectious disease     covid, chickenpox x 2   [2]   Past Surgical History:  Procedure Laterality Date    NE CYSTOURETHROSCOPY N/A 3/12/2024    Procedure: CYSTOSCOPY;  Surgeon: Sanjiv Soares M.D.;  Location: SURGERY [facility];  Service: Gynecology    HYSTERECTOMY ROBOTIC XI N/A 3/12/2024    Procedure: DA JOHNSON TOTAL LAPAROSCOPIC HYSTERECTOMY, BILATERAL SALPINGECTOMY, VAULT SUSPENSION,;  Surgeon: Sanjiv Soares M.D.;  Location: [facility];  Service: Gynecology    APPENDECTOMY LAPAROSCOPIC      [email or web address]    PRIMARY C SECTION      x2   [3]   Current Outpatient Medications on File Prior to Visit   Medication Sig Dispense Refill    loratadine (CLARITIN) 10 MG Tab Take 10 mg by mouth every day.      Multiple Vitamins-Minerals (MULTIVITAMIN ADULTS) Tab Take 1 Tablet by mouth every day.      albuterol 108 (90 Base) MCG/ACT Aero Soln inhalation aerosol Inhale 2 Puffs every 6 hours as needed for Shortness of Breath.       No current facility-administered medications on file prior to visit.   [4]   Allergies  Allergen Reactions    Other Misc Swelling     States cats give her anaphylatic shock    Tree Nuts Food Allergy Swelling     Rash, swells, throats starts to swell    Amoxicillin Hives     Hives      Erythrocin Hives     Hives and swelling     Latex      Breaks out    Skin Adhesives Rash     States brown band aids give her rash    Other Food Rash     Eggs, milk , wheat, soy, strawberries, grasses. Some makes her sneeze, some gives her a rash

## 2025-07-16 ENCOUNTER — TELEPHONE (OUTPATIENT)
Dept: HEALTH INFORMATION MANAGEMENT | Facility: OTHER | Age: 40
End: 2025-07-16
Payer: COMMERCIAL